# Patient Record
Sex: FEMALE | Race: WHITE | Employment: FULL TIME | URBAN - METROPOLITAN AREA
[De-identification: names, ages, dates, MRNs, and addresses within clinical notes are randomized per-mention and may not be internally consistent; named-entity substitution may affect disease eponyms.]

---

## 2019-03-17 ENCOUNTER — APPOINTMENT (OUTPATIENT)
Dept: GENERAL RADIOLOGY | Age: 34
End: 2019-03-17

## 2019-03-17 ENCOUNTER — HOSPITAL ENCOUNTER (EMERGENCY)
Age: 34
Discharge: HOME | End: 2019-03-17
Attending: EMERGENCY MEDICINE

## 2019-03-17 VITALS
BODY MASS INDEX: 34.15 KG/M2 | WEIGHT: 200 LBS | RESPIRATION RATE: 18 BRPM | HEIGHT: 64 IN | OXYGEN SATURATION: 98 % | TEMPERATURE: 98.7 F | HEART RATE: 84 BPM | SYSTOLIC BLOOD PRESSURE: 100 MMHG | DIASTOLIC BLOOD PRESSURE: 59 MMHG

## 2019-03-17 DIAGNOSIS — J02.0 STREPTOCOCCAL SORE THROAT: Primary | ICD-10-CM

## 2019-03-17 LAB
ALBUMIN SERPL-MCNC: 4.1 G/DL (ref 3.5–5.2)
ALP BLD-CCNC: 105 U/L (ref 35–104)
ALT SERPL-CCNC: 18 U/L (ref 0–32)
ANION GAP SERPL CALCULATED.3IONS-SCNC: 17 MMOL/L (ref 7–16)
AST SERPL-CCNC: 14 U/L (ref 0–31)
BACTERIA: ABNORMAL /HPF
BILIRUB SERPL-MCNC: 0.5 MG/DL (ref 0–1.2)
BILIRUBIN URINE: ABNORMAL
BLOOD, URINE: ABNORMAL
BUN BLDV-MCNC: 12 MG/DL (ref 6–20)
CALCIUM SERPL-MCNC: 8.9 MG/DL (ref 8.6–10.2)
CHLORIDE BLD-SCNC: 98 MMOL/L (ref 98–107)
CLARITY: ABNORMAL
CO2: 23 MMOL/L (ref 22–29)
COLOR: YELLOW
CREAT SERPL-MCNC: 0.9 MG/DL (ref 0.5–1)
EPITHELIAL CELLS, UA: ABNORMAL /HPF
GFR AFRICAN AMERICAN: >60
GFR NON-AFRICAN AMERICAN: >60 ML/MIN/1.73
GLUCOSE BLD-MCNC: 117 MG/DL (ref 74–99)
GLUCOSE URINE: NEGATIVE MG/DL
HCT VFR BLD CALC: 43.4 % (ref 34–48)
HEMOGLOBIN: 14.4 G/DL (ref 11.5–15.5)
INFLUENZA A BY PCR: NOT DETECTED
INFLUENZA B BY PCR: NOT DETECTED
KETONES, URINE: 15 MG/DL
LEUKOCYTE ESTERASE, URINE: NEGATIVE
MCH RBC QN AUTO: 30.3 PG (ref 26–35)
MCHC RBC AUTO-ENTMCNC: 33.2 % (ref 32–34.5)
MCV RBC AUTO: 91.2 FL (ref 80–99.9)
NITRITE, URINE: NEGATIVE
PDW BLD-RTO: 12.6 FL (ref 11.5–15)
PH UA: 6 (ref 5–9)
PLATELET # BLD: 204 E9/L (ref 130–450)
PMV BLD AUTO: 9.5 FL (ref 7–12)
POTASSIUM SERPL-SCNC: 4.2 MMOL/L (ref 3.5–5)
PROTEIN UA: 30 MG/DL
RBC # BLD: 4.76 E12/L (ref 3.5–5.5)
RBC UA: ABNORMAL /HPF (ref 0–2)
SODIUM BLD-SCNC: 138 MMOL/L (ref 132–146)
SPECIFIC GRAVITY UA: 1.02 (ref 1–1.03)
STREP GRP A PCR: POSITIVE
TOTAL PROTEIN: 7.3 G/DL (ref 6.4–8.3)
UROBILINOGEN, URINE: 1 E.U./DL
WBC # BLD: 23.4 E9/L (ref 4.5–11.5)
WBC UA: ABNORMAL /HPF (ref 0–5)

## 2019-03-17 PROCEDURE — 6370000000 HC RX 637 (ALT 250 FOR IP): Performed by: NURSE PRACTITIONER

## 2019-03-17 PROCEDURE — 6370000000 HC RX 637 (ALT 250 FOR IP): Performed by: EMERGENCY MEDICINE

## 2019-03-17 PROCEDURE — 87502 INFLUENZA DNA AMP PROBE: CPT

## 2019-03-17 PROCEDURE — 71046 X-RAY EXAM CHEST 2 VIEWS: CPT

## 2019-03-17 PROCEDURE — 87880 STREP A ASSAY W/OPTIC: CPT

## 2019-03-17 PROCEDURE — 6360000002 HC RX W HCPCS: Performed by: EMERGENCY MEDICINE

## 2019-03-17 PROCEDURE — 99283 EMERGENCY DEPT VISIT LOW MDM: CPT

## 2019-03-17 PROCEDURE — 80053 COMPREHEN METABOLIC PANEL: CPT

## 2019-03-17 PROCEDURE — 85027 COMPLETE CBC AUTOMATED: CPT

## 2019-03-17 PROCEDURE — 2580000003 HC RX 258: Performed by: EMERGENCY MEDICINE

## 2019-03-17 PROCEDURE — 6370000000 HC RX 637 (ALT 250 FOR IP): Performed by: INTERNAL MEDICINE

## 2019-03-17 PROCEDURE — 96374 THER/PROPH/DIAG INJ IV PUSH: CPT

## 2019-03-17 PROCEDURE — 36415 COLL VENOUS BLD VENIPUNCTURE: CPT

## 2019-03-17 PROCEDURE — 81001 URINALYSIS AUTO W/SCOPE: CPT

## 2019-03-17 RX ORDER — 0.9 % SODIUM CHLORIDE 0.9 %
1000 INTRAVENOUS SOLUTION INTRAVENOUS ONCE
Status: COMPLETED | OUTPATIENT
Start: 2019-03-17 | End: 2019-03-17

## 2019-03-17 RX ORDER — AMOXICILLIN 250 MG/5ML
250 POWDER, FOR SUSPENSION ORAL ONCE
Status: COMPLETED | OUTPATIENT
Start: 2019-03-17 | End: 2019-03-17

## 2019-03-17 RX ORDER — AMOXICILLIN 500 MG/1
500 CAPSULE ORAL 2 TIMES DAILY
Qty: 20 CAPSULE | Refills: 0 | Status: SHIPPED | OUTPATIENT
Start: 2019-03-17 | End: 2019-03-27

## 2019-03-17 RX ORDER — ONDANSETRON 4 MG/1
4 TABLET, ORALLY DISINTEGRATING ORAL ONCE
Status: COMPLETED | OUTPATIENT
Start: 2019-03-17 | End: 2019-03-17

## 2019-03-17 RX ORDER — KETOROLAC TROMETHAMINE 30 MG/ML
15 INJECTION, SOLUTION INTRAMUSCULAR; INTRAVENOUS ONCE
Status: COMPLETED | OUTPATIENT
Start: 2019-03-17 | End: 2019-03-17

## 2019-03-17 RX ORDER — AMOXICILLIN 125 MG/5ML
250 POWDER, FOR SUSPENSION ORAL ONCE
Status: COMPLETED | OUTPATIENT
Start: 2019-03-17 | End: 2019-03-17

## 2019-03-17 RX ADMIN — SODIUM CHLORIDE 1000 ML: 9 INJECTION, SOLUTION INTRAVENOUS at 13:40

## 2019-03-17 RX ADMIN — ONDANSETRON 4 MG: 4 TABLET, ORALLY DISINTEGRATING ORAL at 13:40

## 2019-03-17 RX ADMIN — AMOXICILLIN 250 MG: 125 POWDER, FOR SUSPENSION ORAL at 16:22

## 2019-03-17 RX ADMIN — KETOROLAC TROMETHAMINE 15 MG: 30 INJECTION, SOLUTION INTRAMUSCULAR at 13:40

## 2019-03-17 RX ADMIN — AMOXICILLIN 250 MG: 250 POWDER, FOR SUSPENSION ORAL at 14:36

## 2019-03-17 ASSESSMENT — PAIN DESCRIPTION - FREQUENCY: FREQUENCY: CONTINUOUS

## 2019-03-17 ASSESSMENT — PAIN DESCRIPTION - DESCRIPTORS: DESCRIPTORS: SHARP

## 2019-03-17 ASSESSMENT — PAIN SCALES - GENERAL
PAINLEVEL_OUTOF10: 10
PAINLEVEL_OUTOF10: 4
PAINLEVEL_OUTOF10: 8

## 2019-03-17 ASSESSMENT — PAIN DESCRIPTION - LOCATION
LOCATION: THROAT
LOCATION: THROAT

## 2019-05-13 ENCOUNTER — HOSPITAL ENCOUNTER (EMERGENCY)
Age: 34
Discharge: HOME | End: 2019-05-13
Attending: EMERGENCY MEDICINE

## 2019-05-13 VITALS
OXYGEN SATURATION: 98 % | DIASTOLIC BLOOD PRESSURE: 68 MMHG | BODY MASS INDEX: 33.47 KG/M2 | WEIGHT: 195 LBS | SYSTOLIC BLOOD PRESSURE: 100 MMHG | RESPIRATION RATE: 16 BRPM | TEMPERATURE: 98.3 F | HEART RATE: 92 BPM

## 2019-05-13 DIAGNOSIS — B34.9 VIRAL SYNDROME: Primary | ICD-10-CM

## 2019-05-13 PROCEDURE — 6370000000 HC RX 637 (ALT 250 FOR IP): Performed by: EMERGENCY MEDICINE

## 2019-05-13 PROCEDURE — 99283 EMERGENCY DEPT VISIT LOW MDM: CPT

## 2019-05-13 RX ORDER — ACETAMINOPHEN 500 MG
1000 TABLET ORAL ONCE
Status: COMPLETED | OUTPATIENT
Start: 2019-05-13 | End: 2019-05-13

## 2019-05-13 RX ORDER — ONDANSETRON 4 MG/1
4 TABLET, ORALLY DISINTEGRATING ORAL ONCE
Status: COMPLETED | OUTPATIENT
Start: 2019-05-13 | End: 2019-05-13

## 2019-05-13 RX ORDER — ONDANSETRON 4 MG/1
4 TABLET, ORALLY DISINTEGRATING ORAL EVERY 8 HOURS PRN
Qty: 10 TABLET | Refills: 0 | Status: SHIPPED | OUTPATIENT
Start: 2019-05-13 | End: 2019-06-07

## 2019-05-13 RX ADMIN — ACETAMINOPHEN 1000 MG: 500 TABLET, FILM COATED ORAL at 15:18

## 2019-05-13 RX ADMIN — ONDANSETRON 4 MG: 4 TABLET, ORALLY DISINTEGRATING ORAL at 15:18

## 2019-05-13 ASSESSMENT — PAIN DESCRIPTION - PAIN TYPE: TYPE: ACUTE PAIN

## 2019-05-13 ASSESSMENT — ENCOUNTER SYMPTOMS
SHORTNESS OF BREATH: 0
EYE DISCHARGE: 0
SORE THROAT: 0
VOMITING: 1
DIARRHEA: 0
WHEEZING: 0
BACK PAIN: 0
EYE REDNESS: 0
EYE PAIN: 0
NAUSEA: 1
COUGH: 0
SINUS PRESSURE: 0
ABDOMINAL DISTENTION: 0

## 2019-05-13 ASSESSMENT — PAIN DESCRIPTION - FREQUENCY: FREQUENCY: CONTINUOUS

## 2019-05-13 ASSESSMENT — PAIN SCALES - GENERAL
PAINLEVEL_OUTOF10: 6
PAINLEVEL_OUTOF10: 6

## 2019-05-13 ASSESSMENT — PAIN DESCRIPTION - LOCATION: LOCATION: HEAD

## 2019-05-13 ASSESSMENT — PAIN DESCRIPTION - PROGRESSION: CLINICAL_PROGRESSION: NOT CHANGED

## 2019-05-13 ASSESSMENT — PAIN DESCRIPTION - DESCRIPTORS: DESCRIPTORS: ACHING

## 2019-05-13 NOTE — ED PROVIDER NOTES
The history is provided by the patient. Illness    The current episode started 2 days ago. The onset was sudden. Associated symptoms include nausea, vomiting, headaches and muscle aches. Pertinent negatives include no fever, no diarrhea, no ear pain, no sore throat, no cough, no wheezing, no rash, no eye discharge, no eye pain and no eye redness. Review of Systems   Constitutional: Negative for chills and fever. HENT: Negative for ear pain, sinus pressure and sore throat. Eyes: Negative for pain, discharge and redness. Respiratory: Negative for cough, shortness of breath and wheezing. Cardiovascular: Negative for chest pain. Gastrointestinal: Positive for nausea and vomiting. Negative for abdominal distention and diarrhea. Genitourinary: Negative for dysuria and frequency. Musculoskeletal: Negative for arthralgias and back pain. Skin: Negative for rash and wound. Neurological: Positive for headaches. Negative for weakness. Hematological: Negative for adenopathy. All other systems reviewed and are negative. Physical Exam   Constitutional: She is oriented to person, place, and time. She appears well-developed and well-nourished. HENT:   Head: Normocephalic and atraumatic. Eyes: Pupils are equal, round, and reactive to light. Neck: Normal range of motion. Neck supple. Cardiovascular: Normal rate, regular rhythm and normal heart sounds. No murmur heard. Pulmonary/Chest: Effort normal and breath sounds normal. No respiratory distress. She has no wheezes. She has no rales. Abdominal: Soft. Bowel sounds are normal. There is no tenderness. There is no rebound and no guarding. Musculoskeletal: She exhibits no edema. Neurological: She is alert and oriented to person, place, and time. No cranial nerve deficit. Coordination normal.   Skin: Skin is warm and dry. Nursing note and vitals reviewed.       Procedures    MDM office tomorrow. Medications   ondansetron (ZOFRAN-ODT) disintegrating tablet 4 mg (4 mg Oral Given 5/13/19 1518)   acetaminophen (TYLENOL) tablet 1,000 mg (1,000 mg Oral Given 5/13/19 1518)           --------------------------------- ADDITIONAL PROVIDER NOTES ---------------------------------  At this time the patient is without objective evidence of an acute process requiring hospitalization or inpatient management. They have remained hemodynamically stable throughout their entire ED visit and are stable for discharge with outpatient follow-up. The plan has been discussed in detail and they are aware of the specific conditions for emergent return, as well as the importance of follow-up. New Prescriptions    ONDANSETRON (ZOFRAN ODT) 4 MG DISINTEGRATING TABLET    Take 1 tablet by mouth every 8 hours as needed for Nausea or Vomiting       Diagnosis:  1. Viral syndrome        Disposition:  Patient's disposition: Discharge to home  Patient's condition is stable.          Kavon Dash MD  05/13/19 0667

## 2019-05-13 NOTE — LETTER
[facility] [address] [place] [address] Emergency Department  [address] [address]  Phone: [phone]               May 13, 2019    Patient: Ronen Alberts   YOB: 1985   Date of Visit: 5/13/2019       To Whom It May Concern:    Ronen Alberts was seen and treated in our emergency department on 5/13/2019. She may return to work on 5/14/2019.       Sincerely,       Lopez Gonsalez RN         Signature:__________________________________

## 2019-06-07 ENCOUNTER — APPOINTMENT (OUTPATIENT)
Dept: GENERAL RADIOLOGY | Age: 34
End: 2019-06-07

## 2019-06-07 ENCOUNTER — HOSPITAL ENCOUNTER (EMERGENCY)
Age: 34
Discharge: HOME | End: 2019-06-07
Attending: EMERGENCY MEDICINE

## 2019-06-07 VITALS
OXYGEN SATURATION: 95 % | DIASTOLIC BLOOD PRESSURE: 70 MMHG | HEIGHT: 65 IN | TEMPERATURE: 98.6 F | HEART RATE: 85 BPM | SYSTOLIC BLOOD PRESSURE: 104 MMHG | RESPIRATION RATE: 16 BRPM | BODY MASS INDEX: 32.32 KG/M2 | WEIGHT: 194 LBS

## 2019-06-07 DIAGNOSIS — S30.1XXA CONTUSION OF ABDOMINAL WALL, INITIAL ENCOUNTER: ICD-10-CM

## 2019-06-07 DIAGNOSIS — S80.811A ABRASION OF RIGHT LEG, INITIAL ENCOUNTER: ICD-10-CM

## 2019-06-07 DIAGNOSIS — S40.022A: ICD-10-CM

## 2019-06-07 DIAGNOSIS — S80.11XA CONTUSION OF LEG, RIGHT, INITIAL ENCOUNTER: Primary | ICD-10-CM

## 2019-06-07 PROCEDURE — 73590 X-RAY EXAM OF LOWER LEG: CPT

## 2019-06-07 PROCEDURE — 99283 EMERGENCY DEPT VISIT LOW MDM: CPT

## 2019-06-07 ASSESSMENT — PAIN DESCRIPTION - PAIN TYPE: TYPE: ACUTE PAIN

## 2019-06-07 ASSESSMENT — ENCOUNTER SYMPTOMS
RESPIRATORY NEGATIVE: 1
EYES NEGATIVE: 1
ALLERGIC/IMMUNOLOGIC NEGATIVE: 1
GASTROINTESTINAL NEGATIVE: 1
COLOR CHANGE: 1

## 2019-06-07 ASSESSMENT — PAIN SCALES - GENERAL: PAINLEVEL_OUTOF10: 6

## 2019-06-07 ASSESSMENT — PAIN DESCRIPTION - LOCATION: LOCATION: LEG

## 2019-06-07 NOTE — ED PROVIDER NOTES
Narcisa Singh off ladder playing tag. Right leg, right abdomen and left axilla bruising / discomfort          Review of Systems   Constitutional: Positive for activity change. HENT: Negative. Eyes: Negative. Respiratory: Negative. Cardiovascular: Negative. Gastrointestinal: Negative. Endocrine: Negative. Genitourinary: Negative. Musculoskeletal: Positive for gait problem. Skin: Positive for color change and wound. Allergic/Immunologic: Negative. Hematological: Negative. Psychiatric/Behavioral: Negative. All other systems reviewed and are negative. Physical Exam   Constitutional: She is oriented to person, place, and time. She appears well-developed and well-nourished. No distress. HENT:   Head: Normocephalic and atraumatic. Eyes: Pupils are equal, round, and reactive to light. Neck: Normal range of motion. Cardiovascular: Normal rate and regular rhythm. Pulmonary/Chest: Effort normal and breath sounds normal.           Abdominal: Soft. There is tenderness. Musculoskeletal: She exhibits tenderness. Right lower leg: She exhibits tenderness. Legs:  Neurological: She is alert and oriented to person, place, and time. Skin: Skin is warm. Capillary refill takes less than 2 seconds. There is erythema. Psychiatric: She has a normal mood and affect. Nursing note and vitals reviewed. Procedures    [facility]  --------------------------------------------- PAST HISTORY ---------------------------------------------  Past Medical History:  has a past medical history of Arthritis and Seizures ([facility] [address] Past Surgical History:  has a past surgical history that includes Cholecystectomy;  section; Facial cosmetic surgery; Tubal ligation (); Cosmetic surgery; Endoscopy, colon, diagnostic; Upper gastrointestinal endoscopy; and Hysterectomy (2016). Social History:  reports that she has been smoking. She has a 18.00 pack-year smoking history.  She has never used smokeless tobacco. She reports that she does not drink alcohol or use drugs. Family History: family history is not on file. The patients home medications have been reviewed. Allergies: Patient has no known allergies. -------------------------------------------------- RESULTS -------------------------------------------------  No results found for this visit on 06/07/19. XR TIBIA FIBULA RIGHT (2 VIEWS)   Final Result   No acute findings. ------------------------- NURSING NOTES AND VITALS REVIEWED ---------------------------   The nursing notes within the ED encounter and vital signs as below have been reviewed. /70   Pulse 85   Temp 98.6 °F (37 °C)   Resp 16   Ht 5' 5\" (1.651 m)   Wt 194 lb (88 kg)   LMP 07/26/2016   SpO2 95%   BMI 32.28 kg/m²   Oxygen Saturation Interpretation: Normal      ------------------------------------------ PROGRESS NOTES ------------------------------------------   I have spoken with the patient and discussed todays results, in addition to providing specific details for the plan of care and counseling regarding the diagnosis and prognosis. Their questions are answered at this time and they are agreeable with the plan.      --------------------------------- ADDITIONAL PROVIDER NOTES ---------------------------------        This patient is stable for discharge. I have shared the specific conditions for return, as well as the importance of follow-up. IMPRESSION:     1. Contusion of leg, right, initial encounter    2. Contusion of abdominal wall, initial encounter    3. Contusion of axillary region, left, initial encounter    4.  Abrasion of right leg, initial encounter      Discharge Medication List as of 6/7/2019  6:32 PM      STOP taking these medications       ondansetron (ZOFRAN ODT) 4 MG disintegrating tablet Comments:   Reason for Stopping:                    Radiology    Xr Tibia Fibula Right (2 Views)    Result Date: 6/7/2019  LOCATION: 200 EXAM: XR TIBIA FIBULA RIGHT (2 VIEWS) COMPARISON: None HISTORY: Extremity injury with pain. TECHNIQUE: 2 views of the right tibia and fibula were obtained. FINDINGS: The bones, joints, and soft tissues are within normal limits. There is no evidence of fracture or malalignment. No radiopaque foreign body noted. No acute findings.          Gabriel Perkins DO  06/07/19 7307

## 2024-01-18 ENCOUNTER — HOSPITAL ENCOUNTER (EMERGENCY)
Facility: HOSPITAL | Age: 39
Discharge: HOME | End: 2024-01-18

## 2024-01-18 VITALS
HEART RATE: 103 BPM | TEMPERATURE: 97 F | WEIGHT: 212 LBS | DIASTOLIC BLOOD PRESSURE: 72 MMHG | HEIGHT: 65 IN | OXYGEN SATURATION: 97 % | RESPIRATION RATE: 16 BRPM | SYSTOLIC BLOOD PRESSURE: 126 MMHG | BODY MASS INDEX: 35.32 KG/M2

## 2024-01-18 PROCEDURE — 4500999001 HC ED NO CHARGE

## 2024-01-18 PROCEDURE — 99281 EMR DPT VST MAYX REQ PHY/QHP: CPT

## 2024-01-18 ASSESSMENT — PAIN - FUNCTIONAL ASSESSMENT: PAIN_FUNCTIONAL_ASSESSMENT: 0-10

## 2024-01-18 ASSESSMENT — PAIN SCALES - GENERAL: PAINLEVEL_OUTOF10: 10 - WORST POSSIBLE PAIN

## 2024-01-18 ASSESSMENT — PAIN DESCRIPTION - DESCRIPTORS: DESCRIPTORS: ACHING

## 2024-01-18 ASSESSMENT — PAIN DESCRIPTION - PAIN TYPE: TYPE: ACUTE PAIN

## 2024-01-18 ASSESSMENT — PAIN DESCRIPTION - FREQUENCY: FREQUENCY: CONSTANT/CONTINUOUS

## 2024-01-18 ASSESSMENT — PAIN DESCRIPTION - LOCATION: LOCATION: TEETH

## 2024-01-25 ENCOUNTER — HOSPITAL ENCOUNTER (EMERGENCY)
Facility: HOSPITAL | Age: 39
Discharge: HOME | End: 2024-01-25
Attending: EMERGENCY MEDICINE

## 2024-01-25 VITALS
SYSTOLIC BLOOD PRESSURE: 115 MMHG | RESPIRATION RATE: 17 BRPM | HEIGHT: 65 IN | WEIGHT: 209.22 LBS | OXYGEN SATURATION: 100 % | DIASTOLIC BLOOD PRESSURE: 75 MMHG | HEART RATE: 60 BPM | BODY MASS INDEX: 34.86 KG/M2 | TEMPERATURE: 99 F

## 2024-01-25 DIAGNOSIS — K02.9 PAIN DUE TO DENTAL CARIES: Primary | ICD-10-CM

## 2024-01-25 PROCEDURE — 99283 EMERGENCY DEPT VISIT LOW MDM: CPT | Performed by: EMERGENCY MEDICINE

## 2024-01-25 PROCEDURE — 2500000001 HC RX 250 WO HCPCS SELF ADMINISTERED DRUGS (ALT 637 FOR MEDICARE OP): Performed by: EMERGENCY MEDICINE

## 2024-01-25 PROCEDURE — 2500000004 HC RX 250 GENERAL PHARMACY W/ HCPCS (ALT 636 FOR OP/ED): Performed by: EMERGENCY MEDICINE

## 2024-01-25 RX ORDER — IBUPROFEN 800 MG/1
800 TABLET ORAL ONCE
Status: COMPLETED | OUTPATIENT
Start: 2024-01-25 | End: 2024-01-25

## 2024-01-25 RX ORDER — OXYCODONE AND ACETAMINOPHEN 5; 325 MG/1; MG/1
1 TABLET ORAL ONCE
Status: COMPLETED | OUTPATIENT
Start: 2024-01-25 | End: 2024-01-25

## 2024-01-25 RX ORDER — PENICILLIN V POTASSIUM 250 MG/1
500 TABLET, FILM COATED ORAL ONCE
Status: COMPLETED | OUTPATIENT
Start: 2024-01-25 | End: 2024-01-25

## 2024-01-25 RX ORDER — PENICILLIN V POTASSIUM 500 MG/1
500 TABLET, FILM COATED ORAL 3 TIMES DAILY
Qty: 21 TABLET | Refills: 0 | Status: SHIPPED | OUTPATIENT
Start: 2024-01-25 | End: 2024-02-01

## 2024-01-25 RX ADMIN — IBUPROFEN 800 MG: 800 TABLET, FILM COATED ORAL at 13:47

## 2024-01-25 RX ADMIN — OXYCODONE HYDROCHLORIDE AND ACETAMINOPHEN 1 TABLET: 5; 325 TABLET ORAL at 13:47

## 2024-01-25 RX ADMIN — PENICILLIN V POTASSIUM 500 MG: 250 TABLET, FILM COATED ORAL at 13:47

## 2024-01-25 ASSESSMENT — PAIN DESCRIPTION - ORIENTATION: ORIENTATION: RIGHT

## 2024-01-25 ASSESSMENT — PAIN SCALES - GENERAL
PAINLEVEL_OUTOF10: 7
PAINLEVEL_OUTOF10: 10 - WORST POSSIBLE PAIN

## 2024-01-25 ASSESSMENT — COLUMBIA-SUICIDE SEVERITY RATING SCALE - C-SSRS
6. HAVE YOU EVER DONE ANYTHING, STARTED TO DO ANYTHING, OR PREPARED TO DO ANYTHING TO END YOUR LIFE?: NO
1. IN THE PAST MONTH, HAVE YOU WISHED YOU WERE DEAD OR WISHED YOU COULD GO TO SLEEP AND NOT WAKE UP?: NO
2. HAVE YOU ACTUALLY HAD ANY THOUGHTS OF KILLING YOURSELF?: NO

## 2024-01-25 ASSESSMENT — PAIN DESCRIPTION - LOCATION
LOCATION: JAW
LOCATION: MOUTH

## 2024-01-25 ASSESSMENT — PAIN DESCRIPTION - DIRECTION: RADIATING_TOWARDS: CHEST

## 2024-01-25 ASSESSMENT — PAIN - FUNCTIONAL ASSESSMENT: PAIN_FUNCTIONAL_ASSESSMENT: 0-10

## 2024-01-25 ASSESSMENT — PAIN DESCRIPTION - DESCRIPTORS: DESCRIPTORS: STABBING;SHARP

## 2024-01-25 NOTE — ED PROVIDER NOTES
"HPI   Chief Complaint   Patient presents with    Dental Pain     Patient reports a toothache that started 5 days ago. Patient states that the pain radiates through the jaw and into her chest. Patient called dentist but isn't able to get an appointment for 2 weeks.       The patient is a 38-year-old female who presents for evaluation of a right lower molar tooth ache.  She states that \"the tooth has been bad\" for a long time but over the past 2 weeks the tooth has been painful.  The pain has gotten particularly bad over the past couple of days.  She contacted a dentist but was told that she could not be seen for a couple of weeks.  She denies any fevers.  She has not noticed any redness or swelling in the face of the neck.  The pain radiates up towards the right ear and down the right lateral side of the neck.                          No data recorded                Patient History   History reviewed. No pertinent past medical history.  History reviewed. No pertinent surgical history.  No family history on file.  Social History     Tobacco Use    Smoking status: Not on file    Smokeless tobacco: Not on file   Substance Use Topics    Alcohol use: Not on file    Drug use: Not on file       Physical Exam   ED Triage Vitals [01/25/24 1302]   Temperature Heart Rate Respirations BP   37.3 °C (99.1 °F) 61 18 114/79      Pulse Ox Temp Source Heart Rate Source Patient Position   100 % Oral Monitor Sitting      BP Location FiO2 (%)     Right arm --       Physical Exam  Constitutional:       General: She is not in acute distress.     Appearance: Normal appearance.   HENT:      Head: Normocephalic and atraumatic.      Mouth/Throat:      Mouth: Mucous membranes are moist.      Pharynx: Oropharynx is clear. No oropharyngeal exudate.      Comments: There is significant decay and erosion involving the right lower second molar.  The tooth is tender with percussion.  There are no visible abscesses near the tooth.  There is perhaps " minimal swelling along the right mandibular ridge but no areas of fluctuance or induration.  There is no drooling, stridor, hoarseness or trismus.  No swelling extends into the anterior neck.  No evidence of Paul's angina.  Eyes:      Extraocular Movements: Extraocular movements intact.      Conjunctiva/sclera: Conjunctivae normal.      Pupils: Pupils are equal, round, and reactive to light.   Cardiovascular:      Rate and Rhythm: Normal rate and regular rhythm.      Heart sounds: No murmur heard.  Pulmonary:      Effort: Pulmonary effort is normal.      Breath sounds: Normal breath sounds. No stridor. No wheezing, rhonchi or rales.   Abdominal:      General: Abdomen is flat. There is no distension.      Palpations: Abdomen is soft.      Tenderness: There is no abdominal tenderness.   Musculoskeletal:      Cervical back: Normal range of motion and neck supple. No rigidity or tenderness.   Lymphadenopathy:      Cervical: No cervical adenopathy.   Skin:     General: Skin is warm and dry.      Findings: No rash.   Neurological:      Mental Status: She is alert.   Psychiatric:         Mood and Affect: Mood normal.         Behavior: Behavior normal.         ED Course & MDM   Diagnoses as of 01/25/24 1328   Pain due to dental caries       Medical Decision Making  Findings are consistent with pain due to dental caries.  There are no visible or palpable abscesses near the tooth.  There is no drooling or stridor or hoarseness or trismus.  No evidence of Paul's angina.  No sign of deep space infection.  The patient was given the first dose of penicillin here and also received a dose of Percocet and ibuprofen.  She will be discharged home on penicillin and will alternate ibuprofen and Tylenol at home.  She was given a dental referral for follow-up in 1 to 4 days and encouraged to return if she develops fever or redness or swelling in the face of the neck or if she feels worse in any way.        Procedure  Procedures      Luis Enrique Phillips,   01/25/24 1321

## 2024-01-25 NOTE — DISCHARGE INSTRUCTIONS
Alternate ibuprofen and Tylenol over-the-counter as discussed.    The next dose of penicillin is due this evening.    Follow-up with the specialist below in 1 to 4 days.

## 2025-01-02 ENCOUNTER — HOSPITAL ENCOUNTER (EMERGENCY)
Facility: HOSPITAL | Age: 40
Discharge: HOME | End: 2025-01-02
Payer: MEDICAID

## 2025-01-02 VITALS
TEMPERATURE: 97.6 F | WEIGHT: 220 LBS | SYSTOLIC BLOOD PRESSURE: 112 MMHG | HEIGHT: 64 IN | HEART RATE: 76 BPM | RESPIRATION RATE: 16 BRPM | BODY MASS INDEX: 37.56 KG/M2 | DIASTOLIC BLOOD PRESSURE: 80 MMHG | OXYGEN SATURATION: 96 %

## 2025-01-02 DIAGNOSIS — K02.9 PAIN DUE TO DENTAL CARIES: ICD-10-CM

## 2025-01-02 DIAGNOSIS — K04.7 DENTAL INFECTION: Primary | ICD-10-CM

## 2025-01-02 DIAGNOSIS — K08.89 TOOTHACHE: ICD-10-CM

## 2025-01-02 PROCEDURE — 99283 EMERGENCY DEPT VISIT LOW MDM: CPT

## 2025-01-02 RX ORDER — ETODOLAC 300 MG/1
300 CAPSULE ORAL 3 TIMES DAILY PRN
Qty: 30 CAPSULE | Refills: 0 | Status: SHIPPED | OUTPATIENT
Start: 2025-01-02 | End: 2025-01-12

## 2025-01-02 RX ORDER — CLINDAMYCIN HYDROCHLORIDE 300 MG/1
300 CAPSULE ORAL 3 TIMES DAILY
Qty: 21 CAPSULE | Refills: 0 | Status: SHIPPED | OUTPATIENT
Start: 2025-01-02 | End: 2025-01-09

## 2025-01-02 ASSESSMENT — PAIN DESCRIPTION - PAIN TYPE
TYPE: ACUTE PAIN
TYPE: ACUTE PAIN

## 2025-01-02 ASSESSMENT — PAIN DESCRIPTION - LOCATION
LOCATION: MOUTH
LOCATION: MOUTH

## 2025-01-02 ASSESSMENT — PAIN - FUNCTIONAL ASSESSMENT: PAIN_FUNCTIONAL_ASSESSMENT: 0-10

## 2025-01-02 ASSESSMENT — COLUMBIA-SUICIDE SEVERITY RATING SCALE - C-SSRS
6. HAVE YOU EVER DONE ANYTHING, STARTED TO DO ANYTHING, OR PREPARED TO DO ANYTHING TO END YOUR LIFE?: NO
2. HAVE YOU ACTUALLY HAD ANY THOUGHTS OF KILLING YOURSELF?: NO
1. IN THE PAST MONTH, HAVE YOU WISHED YOU WERE DEAD OR WISHED YOU COULD GO TO SLEEP AND NOT WAKE UP?: NO

## 2025-01-02 ASSESSMENT — PAIN SCALES - GENERAL
PAINLEVEL_OUTOF10: 8
PAINLEVEL_OUTOF10: 8

## 2025-01-02 ASSESSMENT — PAIN DESCRIPTION - ORIENTATION
ORIENTATION: LEFT
ORIENTATION: LEFT

## 2025-01-02 NOTE — ED TRIAGE NOTES
Pt arrives ambulatory to [facility], presenting with dental pain. Pt states her front tooth is dead, has been since she was 17, and a bump has formed in her gumline, in the last few days. Pt unable to get into dentist at this time. Pt states the bump is painful and seems to be spreading upward. Pt A&Ox3, resp easy and unlabored, skin of appropriate color.   
Home

## 2025-01-02 NOTE — ED PROVIDER NOTES
HPI   Chief Complaint   Patient presents with    Dental Pain       39-year-old female here with left upper tooth pain and slight swelling symptoms over the last 2 days  Patient states that the left upper tooth had been knocked out when she was 17 they put the tooth back in but she has had problems with the tooth since over the last couple days ago she has had increased pain and swelling with a slight bump to the left upper lip on the inner side                Patient History   Past Medical History:   Diagnosis Date    Arthritis     Seizure (Multi)      History reviewed. No pertinent surgical history.  No family history on file.  Social History     Tobacco Use    Smoking status: Not on file    Smokeless tobacco: Not on file   Substance Use Topics    Alcohol use: Not on file    Drug use: Not on file       Physical Exam   ED Triage Vitals [01/02/25 1436]   Temperature Heart Rate Respirations BP   36.1 °C (97 °F) 79 18 117/78      Pulse Ox Temp Source Heart Rate Source Patient Position   97 % Temporal Monitor --      BP Location FiO2 (%)     -- --       Physical Exam  Vitals and nursing note reviewed.   Constitutional:       General: She is not in acute distress.     Appearance: She is well-developed.   HENT:      Head: Normocephalic and atraumatic.      Right Ear: Tympanic membrane, ear canal and external ear normal.      Left Ear: Tympanic membrane, ear canal and external ear normal.      Nose: Nose normal.      Mouth/Throat:      Mouth: Mucous membranes are dry.      Pharynx: Oropharynx is clear.        Comments: Left upper front tooth with slight swelling at the gumline, with tenderness to the upper lip and along the left cheek area but no obvious swelling appreciated  No skin discoloration to the left side of the face or cheek  Eyes:      Conjunctiva/sclera: Conjunctivae normal.   Cardiovascular:      Rate and Rhythm: Normal rate and regular rhythm.      Heart sounds: No murmur heard.  Pulmonary:      Effort:  Pulmonary effort is normal. No respiratory distress.      Breath sounds: Normal breath sounds.   Abdominal:      Palpations: Abdomen is soft.      Tenderness: There is no abdominal tenderness.   Musculoskeletal:         General: No swelling.      Cervical back: Neck supple.   Skin:     General: Skin is warm and dry.      Capillary Refill: Capillary refill takes less than 2 seconds.   Neurological:      General: No focal deficit present.      Mental Status: She is alert and oriented to person, place, and time.   Psychiatric:         Mood and Affect: Mood normal.           ED Course & MDM   Diagnoses as of 01/04/25 1958   Dental infection   Pain due to dental caries   Toothache                 No data recorded     [place] Coma Scale Score: 15 (01/02/25 1440 : Maria Del Carmen Mcclellan RN)                           Medical Decision Making  Differential:   1) tooth infection   2) dental abscess    39-year-old female here with complaints of left upper tooth pain and swelling  Does have a dentist but states she is called and currently no one is answering  Will put her on oral antibiotics and anti-inflammatory med for pain stressed that if she develops any worsening swelling redness to her face she needs to get rechecked, otherwise advised that she see her dentist ASAP to keep attempting to call we will give her information for other dental   Plan: Discussed differential with the patient and /or parents family   Patient to  follow up with the PCP in the next 2-3 days  Return for any worsening symptoms or go to the ER for further evaluation. Patient/family/caregiver  understands return   precautions and discharge instructions and is agreeable to the current plan   Impression: Left upper tooth infection        Orders and Diagnoses for this visit        Procedure  Procedures     Eileen Aggarwal PA-C  01/02/25 1526       Eileen Aggarwal PA-C  01/04/25 1958

## 2025-06-09 ENCOUNTER — APPOINTMENT (OUTPATIENT)
Dept: CARDIOLOGY | Facility: HOSPITAL | Age: 40
End: 2025-06-09
Payer: MEDICAID

## 2025-06-09 ENCOUNTER — APPOINTMENT (OUTPATIENT)
Dept: RADIOLOGY | Facility: HOSPITAL | Age: 40
End: 2025-06-09
Payer: MEDICAID

## 2025-06-09 ENCOUNTER — OFFICE VISIT (OUTPATIENT)
Dept: URGENT CARE | Age: 40
End: 2025-06-09
Payer: MEDICAID

## 2025-06-09 ENCOUNTER — HOSPITAL ENCOUNTER (EMERGENCY)
Facility: HOSPITAL | Age: 40
Discharge: HOME | End: 2025-06-09
Payer: MEDICAID

## 2025-06-09 VITALS
SYSTOLIC BLOOD PRESSURE: 110 MMHG | WEIGHT: 219.36 LBS | DIASTOLIC BLOOD PRESSURE: 72 MMHG | BODY MASS INDEX: 37.65 KG/M2 | TEMPERATURE: 97.8 F | HEART RATE: 89 BPM | RESPIRATION RATE: 16 BRPM | OXYGEN SATURATION: 100 %

## 2025-06-09 VITALS
WEIGHT: 212 LBS | BODY MASS INDEX: 35.32 KG/M2 | SYSTOLIC BLOOD PRESSURE: 124 MMHG | HEART RATE: 73 BPM | RESPIRATION RATE: 20 BRPM | HEIGHT: 65 IN | DIASTOLIC BLOOD PRESSURE: 79 MMHG | TEMPERATURE: 97 F | OXYGEN SATURATION: 98 %

## 2025-06-09 DIAGNOSIS — R06.02 SHORTNESS OF BREATH: ICD-10-CM

## 2025-06-09 DIAGNOSIS — R07.9 CHEST PAIN, UNSPECIFIED TYPE: Primary | ICD-10-CM

## 2025-06-09 DIAGNOSIS — R00.2 PALPITATIONS: Primary | ICD-10-CM

## 2025-06-09 LAB
ALBUMIN SERPL BCP-MCNC: 4.1 G/DL (ref 3.4–5)
ALP SERPL-CCNC: 100 U/L (ref 33–110)
ALT SERPL W P-5'-P-CCNC: 26 U/L (ref 7–45)
ANION GAP SERPL CALC-SCNC: 15 MMOL/L (ref 10–20)
AST SERPL W P-5'-P-CCNC: 23 U/L (ref 9–39)
ATRIAL RATE: 75 BPM
ATRIAL RATE: 75 BPM
BASOPHILS # BLD AUTO: 0.05 X10*3/UL (ref 0–0.1)
BASOPHILS NFR BLD AUTO: 0.6 %
BILIRUB SERPL-MCNC: 0.3 MG/DL (ref 0–1.2)
BUN SERPL-MCNC: 14 MG/DL (ref 6–23)
CALCIUM SERPL-MCNC: 9 MG/DL (ref 8.6–10.3)
CARDIAC TROPONIN I PNL SERPL HS: <3 NG/L (ref 0–13)
CARDIAC TROPONIN I PNL SERPL HS: <3 NG/L (ref 0–13)
CHLORIDE SERPL-SCNC: 104 MMOL/L (ref 98–107)
CO2 SERPL-SCNC: 24 MMOL/L (ref 21–32)
CREAT SERPL-MCNC: 0.89 MG/DL (ref 0.5–1.05)
EGFRCR SERPLBLD CKD-EPI 2021: 85 ML/MIN/1.73M*2
EOSINOPHIL # BLD AUTO: 0.15 X10*3/UL (ref 0–0.7)
EOSINOPHIL NFR BLD AUTO: 1.7 %
ERYTHROCYTE [DISTWIDTH] IN BLOOD BY AUTOMATED COUNT: 12.4 % (ref 11.5–14.5)
GLUCOSE SERPL-MCNC: 105 MG/DL (ref 74–99)
HCG UR QL IA.RAPID: NEGATIVE
HCT VFR BLD AUTO: 43.4 % (ref 36–46)
HGB BLD-MCNC: 14.3 G/DL (ref 12–16)
IMM GRANULOCYTES # BLD AUTO: 0.04 X10*3/UL (ref 0–0.7)
IMM GRANULOCYTES NFR BLD AUTO: 0.5 % (ref 0–0.9)
LYMPHOCYTES # BLD AUTO: 2.79 X10*3/UL (ref 1.2–4.8)
LYMPHOCYTES NFR BLD AUTO: 31.9 %
MAGNESIUM SERPL-MCNC: 2.04 MG/DL (ref 1.6–2.4)
MCH RBC QN AUTO: 31.4 PG (ref 26–34)
MCHC RBC AUTO-ENTMCNC: 32.9 G/DL (ref 32–36)
MCV RBC AUTO: 95 FL (ref 80–100)
MONOCYTES # BLD AUTO: 0.66 X10*3/UL (ref 0.1–1)
MONOCYTES NFR BLD AUTO: 7.5 %
NEUTROPHILS # BLD AUTO: 5.06 X10*3/UL (ref 1.2–7.7)
NEUTROPHILS NFR BLD AUTO: 57.8 %
NRBC BLD-RTO: 0 /100 WBCS (ref 0–0)
P AXIS: 251 DEGREES
P AXIS: 36 DEGREES
P OFFSET: 195 MS
P OFFSET: 198 MS
P ONSET: 128 MS
P ONSET: 148 MS
PLATELET # BLD AUTO: 263 X10*3/UL (ref 150–450)
POTASSIUM SERPL-SCNC: 3.7 MMOL/L (ref 3.5–5.3)
PR INTERVAL: 138 MS
PR INTERVAL: 180 MS
PROT SERPL-MCNC: 7 G/DL (ref 6.4–8.2)
Q ONSET: 217 MS
Q ONSET: 218 MS
QRS COUNT: 12 BEATS
QRS COUNT: 12 BEATS
QRS DURATION: 82 MS
QRS DURATION: 82 MS
QT INTERVAL: 412 MS
QT INTERVAL: 426 MS
QTC CALCULATION(BAZETT): 460 MS
QTC CALCULATION(BAZETT): 475 MS
QTC FREDERICIA: 443 MS
QTC FREDERICIA: 458 MS
R AXIS: 42 DEGREES
R AXIS: 62 DEGREES
RBC # BLD AUTO: 4.55 X10*6/UL (ref 4–5.2)
SODIUM SERPL-SCNC: 139 MMOL/L (ref 136–145)
T AXIS: 50 DEGREES
T AXIS: 55 DEGREES
T OFFSET: 423 MS
T OFFSET: 431 MS
VENTRICULAR RATE: 75 BPM
VENTRICULAR RATE: 75 BPM
WBC # BLD AUTO: 8.8 X10*3/UL (ref 4.4–11.3)

## 2025-06-09 PROCEDURE — 99285 EMERGENCY DEPT VISIT HI MDM: CPT | Mod: 25

## 2025-06-09 PROCEDURE — 84075 ASSAY ALKALINE PHOSPHATASE: CPT | Performed by: PHYSICIAN ASSISTANT

## 2025-06-09 PROCEDURE — 83735 ASSAY OF MAGNESIUM: CPT | Performed by: PHYSICIAN ASSISTANT

## 2025-06-09 PROCEDURE — 85025 COMPLETE CBC W/AUTO DIFF WBC: CPT | Performed by: PHYSICIAN ASSISTANT

## 2025-06-09 PROCEDURE — 71046 X-RAY EXAM CHEST 2 VIEWS: CPT

## 2025-06-09 PROCEDURE — 81025 URINE PREGNANCY TEST: CPT | Performed by: PHYSICIAN ASSISTANT

## 2025-06-09 PROCEDURE — 99205 OFFICE O/P NEW HI 60 MIN: CPT

## 2025-06-09 PROCEDURE — 36415 COLL VENOUS BLD VENIPUNCTURE: CPT | Performed by: PHYSICIAN ASSISTANT

## 2025-06-09 PROCEDURE — 93005 ELECTROCARDIOGRAM TRACING: CPT

## 2025-06-09 PROCEDURE — 84484 ASSAY OF TROPONIN QUANT: CPT | Performed by: PHYSICIAN ASSISTANT

## 2025-06-09 PROCEDURE — 71046 X-RAY EXAM CHEST 2 VIEWS: CPT | Mod: FOREIGN READ | Performed by: RADIOLOGY

## 2025-06-09 RX ORDER — DULOXETIN HYDROCHLORIDE 60 MG/1
60 CAPSULE, DELAYED RELEASE ORAL
COMMUNITY
Start: 2025-03-24

## 2025-06-09 RX ORDER — DOXEPIN HYDROCHLORIDE 10 MG/1
10 CAPSULE ORAL
COMMUNITY
Start: 2024-04-05

## 2025-06-09 RX ORDER — OMEPRAZOLE 40 MG/1
40 CAPSULE, DELAYED RELEASE ORAL
COMMUNITY
Start: 2025-03-24

## 2025-06-09 RX ORDER — FAMOTIDINE 40 MG/1
40 TABLET, FILM COATED ORAL
COMMUNITY
Start: 2025-03-24

## 2025-06-09 RX ORDER — ALBUTEROL SULFATE 90 UG/1
INHALANT RESPIRATORY (INHALATION) EVERY 6 HOURS
COMMUNITY
Start: 2023-03-13

## 2025-06-09 RX ORDER — HYDROXYZINE HYDROCHLORIDE 10 MG/1
10 TABLET, FILM COATED ORAL 3 TIMES DAILY PRN
COMMUNITY
Start: 2025-04-22

## 2025-06-09 RX ORDER — VALACYCLOVIR HYDROCHLORIDE 500 MG/1
500 TABLET, FILM COATED ORAL
COMMUNITY
Start: 2025-01-08

## 2025-06-09 ASSESSMENT — ENCOUNTER SYMPTOMS
ABDOMINAL PAIN: 0
DIARRHEA: 0
DIZZINESS: 0
WHEEZING: 0
VOMITING: 0
SHORTNESS OF BREATH: 1
COUGH: 0
STRIDOR: 0
SORE THROAT: 0
CHILLS: 0

## 2025-06-09 ASSESSMENT — PAIN DESCRIPTION - DIRECTION: RADIATING_TOWARDS: NECK

## 2025-06-09 ASSESSMENT — PAIN DESCRIPTION - DESCRIPTORS: DESCRIPTORS: DISCOMFORT

## 2025-06-09 ASSESSMENT — PAIN SCALES - GENERAL
PAINLEVEL_OUTOF10: 5 - MODERATE PAIN
PAINLEVEL_OUTOF10: 2

## 2025-06-09 ASSESSMENT — PAIN DESCRIPTION - PAIN TYPE: TYPE: ACUTE PAIN

## 2025-06-09 ASSESSMENT — PAIN DESCRIPTION - FREQUENCY: FREQUENCY: INTERMITTENT

## 2025-06-09 ASSESSMENT — PAIN - FUNCTIONAL ASSESSMENT: PAIN_FUNCTIONAL_ASSESSMENT: 0-10

## 2025-06-09 ASSESSMENT — PAIN DESCRIPTION - ORIENTATION: ORIENTATION: LEFT

## 2025-06-09 ASSESSMENT — PAIN DESCRIPTION - LOCATION: LOCATION: CHEST

## 2025-06-09 NOTE — ED PROVIDER NOTES
HPI   Chief Complaint   Patient presents with    Chest Pain       History of present illness:  39-year-old female presents emergency room for complaints of chest pain.  The patient presents with multiple complaints.  She states that she has been having intermittent episodes of what she feels like her palpitations her heart is racing and she states that she has also been having episodes of feeling like her legs are swelling and she feels just very tired.  She states has been going on again off again for the past month.  She states that she does not currently have a cardiologist and does not see anyone.  She does have a primary care doctor through [facility] and is currently being treated for anxiety and depression.  She states that she has no other symptoms at this time and denies any symptoms other than palpitations at this time.    Social history: Negative for alcohol and drug use.    Review of systems:   Gen.: No weight loss, fatigue, anorexia, insomnia, fever.   Eyes: No vision loss, double vision, drainage, eye pain.   ENT: No pharyngitis, neck pain  Cardiac: No chest pain,  syncope, near syncope.   Pulmonary: No shortness of breath, cough, hemoptysis.   Heme/lymph: No swollen glands, fever, bleeding.   GI: No abdominal pain, change in bowel habits, melena, hematemesis, hematochezia, nausea, vomiting, diarrhea.   : No discharge, dysuria, frequency, urgency, hematuria.   Musculoskeletal: No limb pain, joint pain, joint swelling.   Skin: No rashes.   Review of systems is otherwise negative unless stated above or in history of present illness.      Physical exam:  General: Vitals noted, no distress. Afebrile.   EENT: No lymphadenopathy appreciated  Cardiac: Regular, rate, rhythm, no murmur.   Pulmonary: Lungs clear bilaterally with good aeration. No adventitious breath sounds.   Abdomen: Soft, nonsurgical. Nontender. No peritoneal signs. Normoactive bowel sounds.   Extremities: No peripheral edema.   Skin:  No rash.   Neuro: No focal neurologic deficits            Medical decision making:   Testing: Troponin x 2 was negative magnesium 2.04 CMP unremarkable CBC unremarkable pregnancy test negative chest x-ray did not show acute findings interpreted by myself, PERC score was 0  Plan: Home-going.  Discussed differential. Will follow-up with the primary physician in the next 2-3 days. Return if worse. They understand return precautions and discharge instructions. Patient and family/friend/caregiver are in agreement with this plan. 39-year-old female presents emergency room for complaints of chest pain.  The patient presents with multiple complaints.  She states that she has been having intermittent episodes of what she feels like her palpitations her heart is racing and she states that she has also been having episodes of feeling like her legs are swelling and she feels just very tired.  She states has been going on again off again for the past month.  She states that she does not currently have a cardiologist and does not see anyone.  She does have a primary care doctor through [facility] [place] and is currently being treated for anxiety and depression.  She states that she has no other symptoms at this time and denies any symptoms other than palpitations at this time. Cardiac: Regular, rate, rhythm, no murmur.   Pulmonary: Lungs clear bilaterally with good aeration. No adventitious breath sounds.   Abdomen: Soft, nonsurgical. Nontender. No peritoneal signs. Normoactive bowel sounds.  No peripheral edema present on exam of the legs.  I explained to the patient the test results are far at this time and explained.  I do not see anything acute this time that is concerning for any serious life-threatening condition.  The patient was frustrated and explained to me that she was just frustrated why she did not have any further answers at this time I explained to her that unfortunately due to the limitations of the emergency room  we could only rule out life-threatening conditions and I cannot find any life-threatening conditions at this time.  I explained to her that she could be having anxiety versus possible reactions to caffeine and to tobacco smoke.  The patient confirms upon questioning that she does smoke on a regular basis and also smokes marijuana and also drinks coffee throughout the day.  I explained this could be contributing to up I did not see any acute findings at this time that would help explain her symptoms.  I explained to her that I would like her follow-up cardiology at this time but I did not see any reason for admission at this time and felt comfortable sending her home.  I encouraged her return event she had any worsening symptoms.  Impression:   1.  Palpitations       EKG taken on 2/9/2025 at 1357 shows sinus rhythm at 75 no STEMI no T wave inversion normal axis is interpreted by myself    EKG taken on 2/9/2025 at 1544 shows normal sinus at 75 no STEMI no T wave inversion normal axis, HI interval 180 as interpreted by myself          History provided by:  Patient   used: No            Patient History   Medical History[1]  Surgical History[2]  Family History[3]  Social History[4]    Physical Exam   ED Triage Vitals [06/09/25 1358]   Temperature Heart Rate Respirations BP   36.1 °C (96.9 °F) 79 18 120/86      Pulse Ox Temp Source Heart Rate Source Patient Position   100 % Temporal -- --      BP Location FiO2 (%)     -- --       Physical Exam      ED Course & MDM   Diagnoses as of 06/09/25 1908   Palpitations                 No data recorded     [place] Coma Scale Score: 15 (06/09/25 1359 : Yanet Hassan RN)                           Medical Decision Making      Procedure  Procedures       [1]   Past Medical History:  Diagnosis Date    Arthritis     Seizure (Multi)    [2] History reviewed. No pertinent surgical history.  [3] No family history on file.  [4]   Social History  Tobacco Use    Smoking  status: Every Day     Types: Cigarettes    Smokeless tobacco: Current   Substance Use Topics    Alcohol use: Not on file    Drug use: Not on file        Chandana Castaneda PA-C  06/09/25 2781

## 2025-06-09 NOTE — ED TRIAGE NOTES
Pt to ED via EMS from urgent care c/o intermittent chest pain x1 month, pt states currenlty it is 5/10 L sided radiating up her neck, endorses some dizziness, EMS placed IV and devika blood, EKG performed on arrival shows NSR

## 2025-06-09 NOTE — PROGRESS NOTES
Subjective   Patient ID: Jessie Mc is a 39 y.o. female. They present today with a chief complaint of Chest Pain (Flutters and pain sweating, leg swelling X1 month off and on had one before she came here pain radiates to throat).    History of Present Illness  Patient is a 39-year-old female presenting to the clinic with complaints of chest pain radiating to the left side of the neck.  Chest pain is active.  Patient states she has had chest pain and fluttering of the chest on and off for the last month.  Patient states she has active chest pain over the left chest behind her breast radiates up into the left neck.  Patient denies any shortness of breath acutely.  Patient states she has shortness of breath with exertion with associated diaphoresis with exertion.  Patient does smoke a pack of cigarettes daily.  No past medical history according to chart.      History provided by:  Patient  Chest Pain  Associated symptoms: shortness of breath    Associated symptoms: no abdominal pain, no cough, no dizziness and no vomiting        Past Medical History  Allergies as of 06/09/2025 - Reviewed 06/09/2025   Allergen Reaction Noted    Amoxicillin Hives and Itching 03/06/2024    Penicillin Hives and Itching 03/06/2024       Prescriptions Prior to Admission[1]     Medical History[2]    Surgical History[3]     reports that she has been smoking cigarettes. She uses smokeless tobacco.    Review of Systems  Review of Systems   Constitutional:  Negative for chills.   HENT:  Negative for sore throat.    Respiratory:  Positive for shortness of breath. Negative for cough, wheezing and stridor.    Cardiovascular:  Positive for chest pain.   Gastrointestinal:  Negative for abdominal pain, diarrhea and vomiting.   Neurological:  Negative for dizziness.   All other systems reviewed and are negative.                                 Objective    Vitals:    06/09/25 1258   BP: 110/72   Pulse: 89   Resp: 16   Temp: 36.6 °C (97.8 °F)    TempSrc: Oral   SpO2: 100%   Weight: 99.5 kg (219 lb 5.7 oz)     No LMP recorded. Patient has had a hysterectomy.    Physical Exam  Vitals reviewed.   Constitutional:       General: She is not in acute distress.     Appearance: Normal appearance. She is not ill-appearing or toxic-appearing.   HENT:      Head: Normocephalic and atraumatic.   Eyes:      Conjunctiva/sclera: Conjunctivae normal.   Cardiovascular:      Rate and Rhythm: Normal rate and regular rhythm.      Heart sounds: Normal heart sounds. No murmur heard.     No friction rub. No gallop.   Pulmonary:      Effort: Pulmonary effort is normal. No respiratory distress.      Breath sounds: Normal breath sounds. No stridor. No wheezing, rhonchi or rales.   Neurological:      Mental Status: She is alert.         Procedures    Point of Care Test & Imaging Results from this visit  No results found for this visit on 06/09/25.   Imaging  No results found.    Cardiology, Vascular, and Other Imaging  No other imaging results found for the past 2 days      Diagnostic study results (if any) were reviewed by [facility].    Assessment/Plan   Allergies, medications, history, and pertinent labs/EKGs/Imaging reviewed by Elier Sierra PA-C.     Medical Decision Making:    Patient is a 39-year-old female presenting to the clinic with complaints of chest pain radiating to the left side of the neck.  Chest pain is active.  Patient states she has had chest pain and fluttering of the chest on and off for the last month.  Patient states she has active chest pain over the left chest behind her breast radiates up into the left neck.  Patient denies any shortness of breath acutely.  Patient states she has shortness of breath with exertion with associated diaphoresis with exertion.  Patient does smoke a pack of cigarettes daily.  No past medical history according to chart.  Vital signs in the clinic are currently stable.  Physical examination as above.  Patient does not  appear to be in any acute distress.  Patient does states she has chest pain left chest not reproducible to palpation radiating into the left neck.  EKG was obtained on arrival WI interval 134 ms.  QRS 96 ms.  QTc 461 ms.  Heart rate 80 bpm.  Sinus rhythm possible inferior infarct borderline ECG unconfirmed report.  Patient does have Q waves in inferior leads.  No signs of acute ST segment elevations.  Supervising physician reviewed EKG.  Supervising physician agrees given chest pain active with associated EKG changes patient to be transferred to the emergency room via emergency medical services.    Orders and Diagnoses  Diagnoses and all orders for this visit:  Chest pain, unspecified type  -     ECG 12 Lead      Medical Admin Record      Patient disposition: ED    Electronically signed by [facility]  1:10 PM           [1] (Not in a hospital admission)  [2]   Past Medical History:  Diagnosis Date    Arthritis     Seizure (Multi)    [3] History reviewed. No pertinent surgical history.

## 2025-06-26 ENCOUNTER — APPOINTMENT (OUTPATIENT)
Dept: OTOLARYNGOLOGY | Facility: CLINIC | Age: 40
End: 2025-06-26
Payer: MEDICAID

## 2025-06-26 ENCOUNTER — APPOINTMENT (OUTPATIENT)
Dept: AUDIOLOGY | Facility: CLINIC | Age: 40
End: 2025-06-26
Payer: MEDICAID

## 2025-06-26 VITALS — HEIGHT: 66 IN | WEIGHT: 216 LBS | BODY MASS INDEX: 34.72 KG/M2

## 2025-06-26 DIAGNOSIS — L29.9 EAR ITCH: ICD-10-CM

## 2025-06-26 DIAGNOSIS — H93.12 TINNITUS OF LEFT EAR: ICD-10-CM

## 2025-06-26 DIAGNOSIS — L21.9 SEBORRHEIC DERMATITIS, UNSPECIFIED: ICD-10-CM

## 2025-06-26 DIAGNOSIS — H90.3 ASNHL (ASYMMETRICAL SENSORINEURAL HEARING LOSS): ICD-10-CM

## 2025-06-26 DIAGNOSIS — R42 VERTIGO: Primary | ICD-10-CM

## 2025-06-26 DIAGNOSIS — H90.42 SENSORINEURAL HEARING LOSS (SNHL) OF LEFT EAR WITH UNRESTRICTED HEARING OF RIGHT EAR: Primary | ICD-10-CM

## 2025-06-26 DIAGNOSIS — H93.8X2 PRESSURE SENSATION IN LEFT EAR: ICD-10-CM

## 2025-06-26 DIAGNOSIS — L29.9 EAR ITCHING: ICD-10-CM

## 2025-06-26 PROCEDURE — 92557 COMPREHENSIVE HEARING TEST: CPT | Performed by: AUDIOLOGIST

## 2025-06-26 PROCEDURE — 3008F BODY MASS INDEX DOCD: CPT | Performed by: OTOLARYNGOLOGY

## 2025-06-26 PROCEDURE — 92550 TYMPANOMETRY & REFLEX THRESH: CPT | Performed by: AUDIOLOGIST

## 2025-06-26 PROCEDURE — 99204 OFFICE O/P NEW MOD 45 MIN: CPT | Performed by: OTOLARYNGOLOGY

## 2025-06-26 RX ORDER — FLUOCINOLONE ACETONIDE 0.11 MG/ML
OIL AURICULAR (OTIC)
Qty: 1 ML | Refills: 11 | Status: SHIPPED | OUTPATIENT
Start: 2025-06-26

## 2025-06-26 NOTE — PROGRESS NOTES
"Chief Complaint   Patient presents with    New Patient Visit     NP- LEFT EAR HEARING CHECK, HAD AUDIO DONE     HPI:  Jesise Mc is a 39 y.o. female has been having a 1 year history of problems with hearing on the left-hand side.  Seems diminished and very tinny.  Also getting some occasional left-sided tinnitus and pressure and plugged sensation.  Over the past 2 to 3 months she is also been having some dizziness.  Described as a very lightheaded and off-balance sensation with occasional movement.  No mental status changes.  Both ears also itch tremendously.    She did have an audiogram performed today which I did personally review.  Shows borderline hearing bilaterally except in the high frequencies where she does have an asymmetric moderate high-frequency sensorineural hearing loss on the right-hand side.  Discrimination scores on the right are 96% while they are 100% on the left.  She has normal tympanograms bilaterally.  No prior history    PMH:  Medical History[1]  Surgical History[2]      Medications:   Current Medications[3]     Allergies:  Allergies[4]     ROS:  Review of systems normal unless stated otherwise in the HPI and/or PMH.    Physical Exam:  Height 1.676 m (5' 6\"), weight 98 kg (216 lb). Body mass index is 34.86 kg/m².     GENERAL APPEARANCE: Well developed and well nourished.  Alert and oriented in no acute distress.  Normal vocal quality.      HEAD/FACE: No erythema or edema or facial tenderness.  Normal facial nerve function bilaterally.    EAR:       EXTERNAL: Normal pinnas and external auditory canals without lesion or obstructing wax.  Dry       MIDDLE EAR: Tympanic membranes intact and mobile with normal landmarks.  Middle ear space appears well aerated.       TUBE STATUS: N/A       MASTOID CAVITY: N/A       HEARING: Gross hearing assessment is within normal limits.      NOSE:       VISUALIZED USING: Anterior rhinoscopy with headlight and nasal speculum.       DORSUM: Midline, " nontraumatic appearance.       MUCOSA: Normal-appearing.       SECRETIONS: Normal.       SEPTUM: Midline and nonobstructing.       INFERIOR TURBINATES: Normal.       MIDDLE TURBINATES/MEATUS: N/A       BLEEDING: N/A         ORAL CAVITY/PHARYNX:       TEETH: Adequate dentition.       TONGUE: No mass or lesion.  Normal mobility.       FLOOR OF MOUTH: No mass or lesion.       PALATE: Normal hard palate, soft palate, and uvula.       OROPHARYNX: Normal without mass or lesion.       BUCCAL MUCOSA/GBS: Normal without mass or lesion.       LIPS: Normal.    LARYNX/HYPOPHARYNX/NASOPHARYNX: N/A    NECK: No palpable masses or abnormal adenopathy.  Trachea is midline.    THYROID: No thyromegaly or palpable nodule.    SALIVARY GLANDS: Normal bilateral parotid and submandibular glands by inspection and palpation.    TMJ's: Normal.    NEURO: Cranial nerve exam grossly normal bilaterally.       Assessment/Plan   Jessie was seen today for new patient visit.  Diagnoses and all orders for this visit:  Vertigo (Primary)  -     MR IAC w and wo IV contrast; Future  -     Electrocochleography; Future  -     Referral to Physical Therapy; Future  -     Vestibular Testing; Future  ASNHL (asymmetrical sensorineural hearing loss)  -     MR IAC w and wo IV contrast; Future  -     Electrocochleography; Future  -     Referral to Physical Therapy; Future  -     Vestibular Testing; Future  Tinnitus of left ear  -     MR IAC w and wo IV contrast; Future  -     Electrocochleography; Future  -     Referral to Physical Therapy; Future  -     Vestibular Testing; Future  Pressure sensation in left ear  -     MR IAC w and wo IV contrast; Future  -     Electrocochleography; Future  -     Referral to Physical Therapy; Future  -     Vestibular Testing; Future  Ear itch  Seborrheic dermatitis, unspecified  Ear itching  -     fluocinolone (DermOtic) 0.01 % ear drops; 4 drops to the affected ear/s twice daily as needed for itching. 1 bottle. 11 refills.      Provide some education support.  Having a constellation of symptoms including asymmetric left-sided sensorineural hearing loss, tinnitus, pressure, and vertigo.  Multiple central and peripheral etiologies exist including retrocochlear lesion and hydrops.  Will do some testing including MRI of the brain and IACs, ECOG, and VNG.  Will also begin some vestibular therapy and I have instructed her on the use of a low-sodium diet to try to help out with this which is defined as under 1500 mg/day.  She is also having some seborrheic dermatitis which is causing some chronic irritating itchiness of her ears.  Recommend dandruff shampoo washes on a daily basis and use of Derm otic drops as needed  Follow up for test results after testing is complete.     Miles Ignacio MD         [1]   Past Medical History:  Diagnosis Date    Arthritis     Dental disease Last 5 years    Dizziness Two months ago    Ear pain It comes and goes    HL (hearing loss) A year ago    Obesity Last 3 years    Seizure (Multi)     TMJ dysfunction In the last year   [2]   Past Surgical History:  Procedure Laterality Date    COSMETIC SURGERY  17 years old   [3]   Current Outpatient Medications:     albuterol 90 mcg/actuation inhaler, Inhale every 6 hours., Disp: , Rfl:     doxepin (SINEquan) 10 mg capsule, Take 1 capsule (10 mg) by mouth., Disp: , Rfl:     DULoxetine (Cymbalta) 60 mg DR capsule, Take 1 capsule (60 mg) by mouth once daily., Disp: , Rfl:     famotidine (Pepcid) 40 mg tablet, Take 1 tablet (40 mg) by mouth once daily., Disp: , Rfl:     hydrOXYzine HCL (Atarax) 10 mg tablet, Take 1 tablet (10 mg) by mouth 3 times a day as needed., Disp: , Rfl:     omeprazole (PriLOSEC) 40 mg DR capsule, Take 1 capsule (40 mg) by mouth once daily., Disp: , Rfl:     valACYclovir (Valtrex) 500 mg tablet, Take 1 tablet (500 mg) by mouth once daily., Disp: , Rfl:     fluocinolone (DermOtic) 0.01 % ear drops, 4 drops to the affected ear/s twice daily as needed  for itching. 1 bottle. 11 refills., Disp: 1 mL, Rfl: 11  [4]   Allergies  Allergen Reactions    Amoxicillin Hives and Itching    Penicillin Hives and Itching

## 2025-06-26 NOTE — PROGRESS NOTES
AUDIOLOGY  AUDIOMETRIC EVALUATION    Name:  Jessie Mc  :  1985  Age:  39 y.o.  Date of Evaluation:  2025    Reason for visit: Ms. Mc is seen in the clinic today at the request of Miles Ignacio MD in otolaryngology for an audiologic evaluation. Several ear related issues. Chief complaint is itchy ears. She also reports increased difficulty hearing and the need to ask for repetition several times per day. No prior audiologic evaluation is available for comparison.     EVALUATION     See scanned audiogram: “Media” > “Audiology Report” > Document ID [id number].    Objective   Otoscopic Evaluation  Right Ear: minimal non-occluding cerumen with visualization of the tympanic membrane  Left Ear: minimal non-occluding cerumen with visualization of the tympanic membrane    Immittance Measures  Tympanometry:  Right Ear: Type A, normal tympanic membrane mobility with normal middle ear pressure  Left Ear: Type A, normal tympanic membrane mobility with normal middle ear pressure    Acoustic Reflexes:  Ipsilateral Right Ear: present and normal from 500 Hz to 4000 Hz  Ipsilateral Left Ear: present and normal from 500 Hz to 4000 Hz  Contralateral Right Ear: did not evaluate  Contralateral Left Ear: did not evaluate    Distortion Product Otoacoustic Emissions (DPOAEs)  Right Ear: present from 1000 Hz to 5000 Hz, absent from 6000 Hz to 8000 Hz  Left Ear: present from 1000 Hz to 6000 Hz, absent at 8000 Hz    Audiometry  Test Technique and Reliability:   Standard audiometry via supra-aural headphones. Pulsed tone stimulus. Reliability is FAIR.    Pure tone air and bone conduction audiometry:  Right Ear: normal hearing sensitivity  Left Ear: normal hearing sensitivity through 2000 Hz with a moderate rising to mild sensorineural hearing loss in the higher frequencies    Speech Audiometry:  Speech Reception Threshold (SRT) Right Ear: 20 dB HL   Speech Reception Threshold (SRT) Left Ear: 20 dB HL   Word  Recognition Score (WRS) Right Ear: Excellent, 96% at 60 dB HL   Word Recognition Score (WRS) Left Ear: Excellent, 100% at 60 dB HL      IMPRESSIONS     Audiometric evaluation revealed normal hearing sensitivity in the right ear, and normal hearing sensitivity through 2000 Hz with a moderate rising to mild sensorineural hearing loss in the higher frequencies in the left ear. Tympanometry indicates normal tympanic membrane mobility with normal middle ear pressure (Type A) bilaterally. The presence of acoustic reflexes within normal intensity limits is consistent with normal middle ear and brainstem function, and suggests that auditory sensitivity is not significantly impaired. Present Distortion Product Otoacoustic Emissions (DPOAEs) suggest normal/near normal cochlear outer hair cell function and are consistent with no greater than a mild hearing loss at those frequencies. No prior audiologic evaluation is available for comparison.    RECOMMENDATIONS     Discussed results, impressions and recommendations with the patient. Questions were addressed and the patient was encouraged to contact our office should concerns arise.    Follow up with otolaryngology today as scheduled.  Audiologic evaluation in conjunction with otologic care, if an acute change is noted, and/or annually.  Consider a monaural left hearing aid pending medical clearance and motivation. Contact insurance to determine if there is an applicable benefit and where it can be used. Schedule a hearing aid evaluation appointment should she wish to proceed with hearing aids through our clinic.  Implement effective communication strategies: utilizing visual cues/gestures, reducing background noise and distance from desired source, increasing light to assist with visual cues, use of clear speech, et cetera.  Continue use of hearing protective devices when in loud, impact, and/or excessive noise.  Follow up with otolaryngology as planned.  Follow-up with medical  care team as planned.      Time for this encounter: 622-660    Testing and interpretation of results completed by David Doll, CCC-A. It was my pleasure to evaluate this patient.

## 2025-07-03 ENCOUNTER — OFFICE VISIT (OUTPATIENT)
Dept: CARDIOLOGY | Facility: CLINIC | Age: 40
End: 2025-07-03
Payer: MEDICAID

## 2025-07-03 VITALS
BODY MASS INDEX: 35.19 KG/M2 | HEART RATE: 95 BPM | HEIGHT: 65 IN | OXYGEN SATURATION: 98 % | DIASTOLIC BLOOD PRESSURE: 66 MMHG | WEIGHT: 211.2 LBS | SYSTOLIC BLOOD PRESSURE: 96 MMHG

## 2025-07-03 DIAGNOSIS — R00.2 PALPITATIONS: ICD-10-CM

## 2025-07-03 DIAGNOSIS — E78.5 DYSLIPIDEMIA: ICD-10-CM

## 2025-07-03 DIAGNOSIS — F17.200 NICOTINE DEPENDENCE WITH CURRENT USE: ICD-10-CM

## 2025-07-03 DIAGNOSIS — R07.9 CHEST PAIN, UNSPECIFIED TYPE: Primary | ICD-10-CM

## 2025-07-03 PROCEDURE — 99202 OFFICE O/P NEW SF 15 MIN: CPT

## 2025-07-03 PROCEDURE — 99204 OFFICE O/P NEW MOD 45 MIN: CPT | Performed by: INTERNAL MEDICINE

## 2025-07-03 PROCEDURE — 99406 BEHAV CHNG SMOKING 3-10 MIN: CPT | Performed by: INTERNAL MEDICINE

## 2025-07-03 PROCEDURE — 3008F BODY MASS INDEX DOCD: CPT | Performed by: INTERNAL MEDICINE

## 2025-07-03 RX ORDER — DULOXETIN HYDROCHLORIDE 20 MG/1
20 CAPSULE, DELAYED RELEASE ORAL DAILY
COMMUNITY
Start: 2025-06-16

## 2025-07-03 RX ORDER — METHOCARBAMOL 500 MG/1
500 TABLET, FILM COATED ORAL 4 TIMES DAILY
COMMUNITY
Start: 2025-06-11

## 2025-07-03 RX ORDER — TRAZODONE HYDROCHLORIDE 50 MG/1
50 TABLET ORAL NIGHTLY
COMMUNITY
Start: 2025-06-16

## 2025-07-03 RX ORDER — TOPIRAMATE 25 MG/1
25 TABLET, FILM COATED ORAL NIGHTLY
COMMUNITY
Start: 2025-06-16

## 2025-07-03 RX ORDER — BISMUTH SUBSALICYLATE 262 MG
1 TABLET,CHEWABLE ORAL DAILY
COMMUNITY

## 2025-07-03 ASSESSMENT — COLUMBIA-SUICIDE SEVERITY RATING SCALE - C-SSRS
1. IN THE PAST MONTH, HAVE YOU WISHED YOU WERE DEAD OR WISHED YOU COULD GO TO SLEEP AND NOT WAKE UP?: NO
2. HAVE YOU ACTUALLY HAD ANY THOUGHTS OF KILLING YOURSELF?: NO
6. HAVE YOU EVER DONE ANYTHING, STARTED TO DO ANYTHING, OR PREPARED TO DO ANYTHING TO END YOUR LIFE?: NO

## 2025-07-03 ASSESSMENT — ENCOUNTER SYMPTOMS
LOSS OF SENSATION IN FEET: 0
OCCASIONAL FEELINGS OF UNSTEADINESS: 0
DEPRESSION: 0

## 2025-07-03 ASSESSMENT — PATIENT HEALTH QUESTIONNAIRE - PHQ9
2. FEELING DOWN, DEPRESSED OR HOPELESS: NOT AT ALL
1. LITTLE INTEREST OR PLEASURE IN DOING THINGS: NOT AT ALL
SUM OF ALL RESPONSES TO PHQ9 QUESTIONS 1 AND 2: 0

## 2025-07-03 ASSESSMENT — PAIN SCALES - GENERAL: PAINLEVEL_OUTOF10: 0-NO PAIN

## 2025-07-03 NOTE — PATIENT INSTRUCTIONS
You were seen at the [facility] for a check up of your heart.     For your chest pain and high cholesterol, I am ordering a stress treadmill ECG test.    For your palpitations, I am ordering a 2 week heart rate monitor.    Your palpitations are likely from extra heart beats called premature ectopic beats (PACs, PVCs). We saw a few of these extra beats on your ECG from the ER.    Common triggers for palpitations included lack of sleep, emotional stress, caffeine, and alcohol. Keep up your exercise program.    Your cholesterol levels in January 2025 were high. I am ordering repeat fasting cholesterol lab work to check your progress with your diet change and exercise program to lower cholesterol and blood sugar.    We will notify you of your test results to decide on follow up recommendations.  Quitting Smoking    Quitting smoking is the most important step you can take to improve your health. We're glad you have set a goal to improve your health.    Quit Smoking Resources    In addition to medications, use the STAR plan to help you successfully quit.   Stick with your quit date!   Tell friends, family, and coworkers your quit date. Request their understanding and support.  Anticipate and prepare for challenges. Some examples are withdrawal symptoms, being around others who smoke, and drinking alcohol.  Remove all tobacco products and paraphernalia from your environment. Make your home and vehicles smoke-free.    Free resources for additional support:  National tobacco quitline: 1-800-QUIT-NOW (1-398.992.8680).  SmokefreeTXT is a free text program to assist you in quitting. Visit https://www.smokefree.gov/smokefreetxt for more information.

## 2025-07-03 NOTE — PROGRESS NOTES
Subjective   Jessie Mc is a 39 y.o. female who presents to the [facility] for evaluation of chest pain and palpitations.     Episodes of palpitations have increased in frequency in the last 3 months from 1-2 times per week to almost daily. Duration of symptoms lasts for seconds to up to 5 minutes. Most severe episode lasted for 5 minutes while driving with chest pressure and dyspnea that prompted 6/9/2025 ER visit. ECG there showed PACs.     Coronary artery disease risk factors included dyslipidemia (January 2025 LDL > 190) and tobacco use.    Today, Ms. Mc has no active cardiac symptoms of dyspnea on exertion, PND, orthopnea, SABINO, syncope, or claudication.     Past Medical History:  1. Anxiety / depression disorder  2. Dyslipidemia  3. Nicotine dependence   4. Palpitations    Social History:  Active smoker    Family History:  Mother has atrial fibrillation. No premature CAD in 1st degree relatives ( 55 years of age for male relatives,  65 years of age for female relatives. Paternal grandfather had CAD and MI.     Review of Systems    A 14 point review of systems was asked. All questions were negative except for pertinent positives listed in the HPI.     Current Outpatient Medications on File Prior to Visit   Medication Sig Dispense Refill    albuterol 90 mcg/actuation inhaler Inhale every 6 hours.      doxepin (SINEquan) 10 mg capsule Take 1 capsule (10 mg) by mouth.      DULoxetine (Cymbalta) 20 mg DR capsule Take 1 capsule (20 mg) by mouth once daily.      DULoxetine (Cymbalta) 60 mg DR capsule Take 1 capsule (60 mg) by mouth once daily.      famotidine (Pepcid) 40 mg tablet Take 1 tablet (40 mg) by mouth once daily.      fluocinolone (DermOtic) 0.01 % ear drops 4 drops to the affected ear/s twice daily as needed for itching. 1 bottle. 11 refills. 1 mL 11    hydrOXYzine HCL (Atarax) 10 mg tablet Take 1 tablet (10 mg) by mouth 3 times a day as needed.      methocarbamol  "(Robaxin) 500 mg tablet Take 1 tablet (500 mg) by mouth 4 times a day.      multivitamin tablet Take 1 tablet by mouth once daily.      omeprazole (PriLOSEC) 40 mg DR capsule Take 1 capsule (40 mg) by mouth once daily.      topiramate (Topamax) 25 mg tablet Take 1 tablet (25 mg) by mouth once daily at bedtime.      traZODone (Desyrel) 50 mg tablet Take 1 tablet (50 mg) by mouth once daily at bedtime.      valACYclovir (Valtrex) 500 mg tablet Take 1 tablet (500 mg) by mouth once daily.       No current facility-administered medications on file prior to visit.         Objective   Physical Exam  BP Readings from Last 3 Encounters:   25 96/66   25 124/79   25 110/72      Wt Readings from Last 3 Encounters:   25 95.8 kg (211 lb 3.2 oz)   25 98 kg (216 lb)   25 96.2 kg (212 lb)      BMI: Estimated body mass index is 35.15 kg/m² as calculated from the following:    Height as of this encounter: 1.651 m (5' 5\").    Weight as of this encounter: 95.8 kg (211 lb 3.2 oz).  BSA: Estimated body surface area is 2.1 meters squared as calculated from the following:    Height as of this encounter: 1.651 m (5' 5\").    Weight as of this encounter: 95.8 kg (211 lb 3.2 oz).    General: no acute distress  HEENT: EOMI  Lungs: Good air movement bilaterally with no wheezing/crackles/rhonchi  Cardiovascular: Regular rhythm without murmurs/rubs/gallops; JVP is normal  Abdomen: Soft  Extremities: Warm with 2+ distal pulses; no edema  Neurologic: Alert and oriented x3.      I have personally reviewed the following images and laboratory findings:  Last echocardiogram:  None on file    Last cath / stress test:  None on file    2022 CT chest report: Normal aorta course and caliber. No coronary or aorta calcifications.    Most recent EC2025 ECG: Sinus rhythm, 75 bpm, PACs, normal ECG. Personally reviewed in office.    No results found for: \"CHOL\"  No results found for: \"HDL\"  No results found for: " "\"LDLCALC\"  No results found for: \"TRIG\"  No components found for: \"CHOLHDL\"   1/8/2025 lipid panel (access by Care Everywhere):  /  / HLD 35 /      Assessment/Plan   1. Palpitations:   Increase in symptom frequency. Has not improved with reducing intake of caffeine and increase in aerobic exercise. Will have Ms. Mc wear a 2 week HR monitor to assess for abnormal tachyarrhythmia.    2. Chest pain:  Multiple risk factors for coronary artery disease and future ischemic cardiac events (dyslipidemia and nicotine dependence). Will have Ms. Mc undergo a stress treadmill ECG test to assess for coronary ischemia now. Check repeat fasting lipid panel to check lifestyle modification program effect and check lipoprotein A level to determine if we should start a statin medication for primary CAD prevention.    Smoking cessation counseled for > 3 minutes.           SIGNATURE: Chadwick Jeffers M.D.  [facility]  Senior Attending, Division of Cardiovascular Medicine  Co-Director, [facility]   of Medicine  [facility] [employer]  [address]  [place] [id number]  Phone: [phone]  Fax: [phone]  Appointment: [phone]  PATIENT NAME: Jessie Mc   DATE/TIME: July 3, 2025 9:32 AM MRN: [id number]       Tobacco Counseling  3 - 5 minutes were spent counseling the patient on tobacco cessation.  Benefits of cessation were discussed as well as techniques to help quit.   "

## 2025-07-05 LAB
ATRIAL RATE: 75 BPM
ATRIAL RATE: 75 BPM
P AXIS: 251 DEGREES
P AXIS: 36 DEGREES
P OFFSET: 195 MS
P OFFSET: 198 MS
P ONSET: 128 MS
P ONSET: 148 MS
PR INTERVAL: 138 MS
PR INTERVAL: 180 MS
Q ONSET: 217 MS
Q ONSET: 218 MS
QRS COUNT: 12 BEATS
QRS COUNT: 12 BEATS
QRS DURATION: 82 MS
QRS DURATION: 82 MS
QT INTERVAL: 412 MS
QT INTERVAL: 426 MS
QTC CALCULATION(BAZETT): 460 MS
QTC CALCULATION(BAZETT): 475 MS
QTC FREDERICIA: 443 MS
QTC FREDERICIA: 458 MS
R AXIS: 42 DEGREES
R AXIS: 62 DEGREES
T AXIS: 50 DEGREES
T AXIS: 55 DEGREES
T OFFSET: 423 MS
T OFFSET: 431 MS
VENTRICULAR RATE: 75 BPM
VENTRICULAR RATE: 75 BPM

## 2025-07-09 ENCOUNTER — APPOINTMENT (OUTPATIENT)
Dept: RADIOLOGY | Facility: HOSPITAL | Age: 40
End: 2025-07-09
Payer: MEDICAID

## 2025-07-09 DIAGNOSIS — H90.3 ASNHL (ASYMMETRICAL SENSORINEURAL HEARING LOSS): ICD-10-CM

## 2025-07-09 DIAGNOSIS — H93.12 TINNITUS OF LEFT EAR: ICD-10-CM

## 2025-07-09 DIAGNOSIS — R42 VERTIGO: ICD-10-CM

## 2025-07-09 DIAGNOSIS — H93.8X2 PRESSURE SENSATION IN LEFT EAR: ICD-10-CM

## 2025-07-09 PROCEDURE — 70553 MRI BRAIN STEM W/O & W/DYE: CPT | Performed by: RADIOLOGY

## 2025-07-09 PROCEDURE — A9575 INJ GADOTERATE MEGLUMI 0.1ML: HCPCS | Mod: SE | Performed by: OTOLARYNGOLOGY

## 2025-07-09 PROCEDURE — 2550000001 HC RX 255 CONTRASTS: Mod: SE | Performed by: OTOLARYNGOLOGY

## 2025-07-09 PROCEDURE — 70553 MRI BRAIN STEM W/O & W/DYE: CPT

## 2025-07-09 RX ORDER — GADOTERATE MEGLUMINE 376.9 MG/ML
19 INJECTION INTRAVENOUS
Status: COMPLETED | OUTPATIENT
Start: 2025-07-09 | End: 2025-07-09

## 2025-07-09 RX ADMIN — GADOTERATE MEGLUMINE 19 ML: 376.9 INJECTION INTRAVENOUS at 17:17

## 2025-07-14 ENCOUNTER — OFFICE VISIT (OUTPATIENT)
Dept: URGENT CARE | Age: 40
End: 2025-07-14
Payer: MEDICAID

## 2025-07-14 ENCOUNTER — ANCILLARY PROCEDURE (OUTPATIENT)
Dept: URGENT CARE | Age: 40
End: 2025-07-14
Payer: MEDICAID

## 2025-07-14 VITALS
RESPIRATION RATE: 16 BRPM | OXYGEN SATURATION: 97 % | WEIGHT: 211 LBS | TEMPERATURE: 97.8 F | HEIGHT: 65 IN | DIASTOLIC BLOOD PRESSURE: 72 MMHG | BODY MASS INDEX: 35.16 KG/M2 | HEART RATE: 73 BPM | SYSTOLIC BLOOD PRESSURE: 109 MMHG

## 2025-07-14 DIAGNOSIS — M25.512 ACUTE PAIN OF LEFT SHOULDER: Primary | ICD-10-CM

## 2025-07-14 DIAGNOSIS — M25.512 ACUTE PAIN OF LEFT SHOULDER: ICD-10-CM

## 2025-07-14 PROCEDURE — 73030 X-RAY EXAM OF SHOULDER: CPT | Mod: LEFT SIDE

## 2025-07-14 RX ORDER — NAPROXEN 500 MG/1
500 TABLET ORAL 2 TIMES DAILY PRN
Qty: 20 TABLET | Refills: 0 | Status: SHIPPED | OUTPATIENT
Start: 2025-07-14 | End: 2026-07-14

## 2025-07-14 ASSESSMENT — PAIN SCALES - GENERAL: PAINLEVEL_OUTOF10: 5

## 2025-07-14 NOTE — LETTER
July 14, 2025     Patient: Jessie Mc   YOB: 1985   Date of Visit: 7/14/2025       To Whom It May Concern:    Jessie Mc was seen in my clinic on 7/14/2025 at 4:10 pm. Please excuse Jessie for her absence from work on this day to make the appointment.  Can return to work tomorrow 7/15/2025.  No heavy lifting greater than 5 to 10 pounds with the left arm until cleared by orthopedist.    If you have any questions or concerns, please don't hesitate to call.         Sincerely,         Shawna Keenan PA-C        CC: No Recipients

## 2025-07-14 NOTE — PROGRESS NOTES
"Subjective   Patient ID: Jessie Mc is a 39 y.o. female. They present today with a chief complaint of Other (Left shoulder pain x 3 weeks denies injury).    History of Present Illness  39-year-old female presents urgent care for left shoulder pain x 3 weeks denies any known injury.  States hurts with range of motion and cannot bring left arm past 90 degrees.  Patient's neurovascular intact.  Denies any current fevers or chills, nausea vomiting or sweats, chest pain shortness breath, palpitations, abdominal pain.  Has equal  strength.  Radial pulses intact.  Bilateral median, ulnar, radial nerves intact motor and sensory.  No overlying skin changes.  No midline C-spine, T-spine, L-spine tenderness.  No tenderness to the left clavicle.  Tenderness greatest at the anterior and lateral left shoulder.  No crepitus to palpation.  Radiologist impression of the left shoulder x-ray states \"Normal appearance of the left shoulder.\".  Patient placed in sling for comfort and instructed to take arm out multiple times through the day for light stretches and arm swings to prevent frozen shoulder.  Prescribed naproxen.  Since pain has been going on for 3 weeks and does not feel like it is improving was given Ortho referral.  Educated on RICE instructions.  ER precautions.  Patient agrees with plan.          Past Medical History  Allergies as of 07/14/2025 - Reviewed 07/14/2025   Allergen Reaction Noted    Amoxicillin Hives and Itching 03/06/2024    Penicillin Hives and Itching 03/06/2024       Prescriptions Prior to Admission[1]     Medical History[2]    Surgical History[3]     reports that she has been smoking cigarettes. She uses smokeless tobacco. She reports that she does not currently use alcohol after a past usage of about 7.0 standard drinks of alcohol per week. She reports current drug use. Frequency: 7.00 times per week. Drug: Marijuana.    Review of Systems  Review of Systems   All other systems reviewed and " "are negative.                                 Objective    Vitals:    07/14/25 1603   BP: 109/72   BP Location: Right arm   Patient Position: Sitting   BP Cuff Size: Large adult   Pulse: 73   Resp: 16   Temp: 36.6 °C (97.8 °F)   TempSrc: Oral   SpO2: 97%   Weight: 95.7 kg (211 lb)   Height: 1.651 m (5' 5\")     No LMP recorded. Patient has had a hysterectomy.    Physical Exam  Vitals reviewed.   Constitutional:       General: She is not in acute distress.     Appearance: Normal appearance. She is not ill-appearing, toxic-appearing or diaphoretic.   HENT:      Head: Normocephalic and atraumatic.      Nose: Nose normal.   Cardiovascular:      Rate and Rhythm: Normal rate and regular rhythm.   Pulmonary:      Effort: Pulmonary effort is normal. No respiratory distress.   Musculoskeletal:      Cervical back: Normal range of motion and neck supple.      Comments: Has equal  strength.  Full sensation to the bilateral upper extremities.  Radial pulses intact.  Bilateral median, ulnar, radial nerves intact motor and sensory.  No overlying skin changes or obvious deformity to the left shoulder.  No midline C-spine, T-spine, L-spine tenderness.  No tenderness to the left clavicle.  Tenderness greatest at the anterior and lateral left shoulder.  No crepitus to palpation.  Patient declines to perform range of motion with left shoulder due to pain.  Patient has normal range of motion with right upper extremity.   Skin:     General: Skin is warm and dry.   Neurological:      General: No focal deficit present.      Mental Status: She is alert and oriented to person, place, and time.   Psychiatric:         Mood and Affect: Mood normal.         Behavior: Behavior normal.         Procedures    Point of Care Test & Imaging Results from this visit  No results found for this visit on 07/14/25.   Imaging  No results found.    Cardiology, Vascular, and Other Imaging  No other imaging results found for the past 2 days      Diagnostic " "study results (if any) were reviewed by Shawna Keenan PA-C.    Assessment/Plan   Allergies, medications, history, and pertinent labs/EKGs/Imaging reviewed by Shawna Keenan PA-C.     Medical Decision Making  39-year-old female presents urgent care for left shoulder pain x 3 weeks denies any known injury.  States hurts with range of motion and cannot bring left arm past 90 degrees.  Patient's neurovascular intact.  Denies any current fevers or chills, nausea vomiting or sweats, chest pain shortness breath, palpitations, abdominal pain.  Has equal  strength.  Radial pulses intact.  Bilateral median, ulnar, radial nerves intact motor and sensory.  No overlying skin changes.  No midline C-spine, T-spine, L-spine tenderness.  No tenderness to the left clavicle.  Tenderness greatest at the anterior and lateral left shoulder.  No crepitus to palpation.  Radiologist impression of the left shoulder x-ray states \"Normal appearance of the left shoulder.\".  Patient placed in sling for comfort and instructed to take arm out multiple times through the day for light stretches and arm swings to prevent frozen shoulder.  Prescribed naproxen.  Since pain has been going on for 3 weeks and does not feel like it is improving was given Ortho referral.  Educated on RICE instructions.  ER precautions.  Patient agrees with plan.    Orders and Diagnoses  There are no diagnoses linked to this encounter.    Medical Admin Record      Patient disposition: Home    Electronically signed by Shawna Keenan PA-C  4:14 PM           [1] (Not in a hospital admission)  [2]   Past Medical History:  Diagnosis Date    Abnormal ECG In chart    Arthritis     Dental disease Last 5 years    Diabetes mellitus (Multi) Pre diabetic for years    Dizziness Two months ago    Ear pain It comes and goes    HL (hearing loss) A year ago    Obesity Last 3 years    Seizure (Multi)     TMJ dysfunction In the last year   [3]   Past Surgical " History:  Procedure Laterality Date    COSMETIC SURGERY  17 years old

## 2025-07-14 NOTE — PATIENT INSTRUCTIONS
Call referral number today to schedule appointment with orthopedist for reassessment.  Take medications as prescribed.  Do not take ibuprofen or any other NSAIDs when taking naproxen.  Use sling only for comfort.  Make sure to not wear the sling constantly throughout the day.  Please take your arm out and do some light shoulder stretches/arm swings to prevent frozen shoulder.  If you develop unmanageable pain/pain at a proportion to what you believe you should be, numbness/weakness, your extremity becomes cool or pale white/changes colors or any other concerning symptoms of compromise in neurovascular status please go to the emergency department immediately.

## 2025-07-15 ENCOUNTER — HOSPITAL ENCOUNTER (OUTPATIENT)
Dept: CARDIOLOGY | Facility: HOSPITAL | Age: 40
Discharge: HOME | End: 2025-07-15
Payer: MEDICAID

## 2025-07-15 ENCOUNTER — APPOINTMENT (OUTPATIENT)
Dept: CARDIOLOGY | Facility: CLINIC | Age: 40
End: 2025-07-15
Payer: MEDICAID

## 2025-07-15 DIAGNOSIS — R00.2 PALPITATIONS: ICD-10-CM

## 2025-07-15 DIAGNOSIS — R07.9 CHEST PAIN, UNSPECIFIED TYPE: ICD-10-CM

## 2025-07-15 PROCEDURE — 93017 CV STRESS TEST TRACING ONLY: CPT

## 2025-07-15 PROCEDURE — 93016 CV STRESS TEST SUPVJ ONLY: CPT | Performed by: INTERNAL MEDICINE

## 2025-07-15 PROCEDURE — 93246 EXT ECG>7D<15D RECORDING: CPT

## 2025-07-15 PROCEDURE — 93018 CV STRESS TEST I&R ONLY: CPT | Performed by: INTERNAL MEDICINE

## 2025-07-16 ENCOUNTER — TELEPHONE (OUTPATIENT)
Dept: CARDIOLOGY | Facility: CLINIC | Age: 40
End: 2025-07-16
Payer: MEDICAID

## 2025-08-11 ENCOUNTER — APPOINTMENT (OUTPATIENT)
Dept: ORTHOPEDIC SURGERY | Facility: CLINIC | Age: 40
End: 2025-08-11
Payer: MEDICAID

## 2025-08-29 ENCOUNTER — APPOINTMENT (OUTPATIENT)
Dept: AUDIOLOGY | Facility: CLINIC | Age: 40
End: 2025-08-29
Payer: MEDICAID

## 2025-08-29 DIAGNOSIS — R42 DIZZINESS: ICD-10-CM

## 2025-08-29 DIAGNOSIS — H93.293 ABNORMAL AUDITORY PERCEPTION OF BOTH EARS: ICD-10-CM

## 2025-08-29 DIAGNOSIS — H90.3 ASNHL (ASYMMETRICAL SENSORINEURAL HEARING LOSS): ICD-10-CM

## 2025-08-29 DIAGNOSIS — H90.3 ASNHL (ASYMMETRICAL SENSORINEURAL HEARING LOSS): Primary | ICD-10-CM

## 2025-08-29 DIAGNOSIS — H93.12 TINNITUS OF LEFT EAR: ICD-10-CM

## 2025-08-29 DIAGNOSIS — H93.8X2 PRESSURE SENSATION IN LEFT EAR: ICD-10-CM

## 2025-08-29 DIAGNOSIS — R42 VERTIGO: ICD-10-CM

## 2025-08-29 PROCEDURE — 92537 CALORIC VSTBLR TEST W/REC: CPT | Performed by: AUDIOLOGIST

## 2025-08-29 PROCEDURE — 92584 ELECTROCOCHLEOGRAPHY: CPT | Performed by: AUDIOLOGIST

## 2025-08-29 PROCEDURE — 92540 BASIC VESTIBULAR EVALUATION: CPT | Performed by: AUDIOLOGIST

## 2025-08-29 PROCEDURE — 92700 UNLISTED ORL SERVICE/PX: CPT | Performed by: AUDIOLOGIST

## 2025-09-04 ENCOUNTER — APPOINTMENT (OUTPATIENT)
Dept: AUDIOLOGY | Facility: CLINIC | Age: 40
End: 2025-09-04
Payer: MEDICAID

## 2025-09-05 ENCOUNTER — APPOINTMENT (OUTPATIENT)
Dept: AUDIOLOGY | Facility: CLINIC | Age: 40
End: 2025-09-05
Payer: MEDICAID

## 2025-09-08 ENCOUNTER — APPOINTMENT (OUTPATIENT)
Dept: ORTHOPEDIC SURGERY | Facility: CLINIC | Age: 40
End: 2025-09-08
Payer: MEDICAID

## 2025-09-09 ENCOUNTER — APPOINTMENT (OUTPATIENT)
Dept: OTOLARYNGOLOGY | Facility: CLINIC | Age: 40
End: 2025-09-09
Payer: MEDICAID

## 2025-09-22 ENCOUNTER — APPOINTMENT (OUTPATIENT)
Dept: ORTHOPEDIC SURGERY | Facility: CLINIC | Age: 40
End: 2025-09-22
Payer: MEDICAID